# Patient Record
Sex: FEMALE | Race: WHITE | NOT HISPANIC OR LATINO | URBAN - NONMETROPOLITAN AREA
[De-identification: names, ages, dates, MRNs, and addresses within clinical notes are randomized per-mention and may not be internally consistent; named-entity substitution may affect disease eponyms.]

---

## 2021-04-02 ENCOUNTER — OFFICE VISIT (OUTPATIENT)
Dept: URBAN - NONMETROPOLITAN AREA CLINIC 13 | Facility: CLINIC | Age: 86
End: 2021-04-02

## 2021-04-16 ENCOUNTER — LAB OUTSIDE AN ENCOUNTER (OUTPATIENT)
Dept: URBAN - NONMETROPOLITAN AREA CLINIC 13 | Facility: CLINIC | Age: 86
End: 2021-04-16

## 2021-04-16 ENCOUNTER — OFFICE VISIT (OUTPATIENT)
Dept: URBAN - NONMETROPOLITAN AREA CLINIC 13 | Facility: CLINIC | Age: 86
End: 2021-04-16
Payer: MEDICARE

## 2021-04-16 DIAGNOSIS — R19.4 CHANGE IN BOWEL HABITS: ICD-10-CM

## 2021-04-16 DIAGNOSIS — K62.5 RECTAL BLEEDING: ICD-10-CM

## 2021-04-16 DIAGNOSIS — R19.5 MUCOUS IN STOOLS: ICD-10-CM

## 2021-04-16 DIAGNOSIS — D64.9 ANEMIA, UNSPECIFIED TYPE: ICD-10-CM

## 2021-04-16 PROCEDURE — 99204 OFFICE O/P NEW MOD 45 MIN: CPT | Performed by: NURSE PRACTITIONER

## 2021-04-16 RX ORDER — MULTIVIT-MIN/IRON/FOLIC ACID/K 18-600-40
AS DIRECTED CAPSULE ORAL
Status: ACTIVE | COMMUNITY

## 2021-04-16 RX ORDER — METOPROLOL TARTRATE 25 MG/1
1 TABLET WITH FOOD TABLET, FILM COATED ORAL TWICE A DAY
Status: ACTIVE | COMMUNITY

## 2021-04-16 RX ORDER — FAMOTIDINE 20 MG/1
1 TABLET AT BEDTIME AS NEEDED TABLET, FILM COATED ORAL ONCE A DAY
Status: ACTIVE | COMMUNITY

## 2021-04-16 RX ORDER — CLOTRIMAZOLE AND BETAMETHASONE DIPROPIONATE 10; .5 MG/ML; MG/ML
1 APPLICATION LOTION TOPICAL TWICE A DAY
Status: ACTIVE | COMMUNITY

## 2021-04-16 RX ORDER — SODIUM, POTASSIUM,MAG SULFATES 17.5-3.13G
354ML SOLUTION, RECONSTITUTED, ORAL ORAL
Qty: 354 MILLILITER | Refills: 0 | OUTPATIENT
Start: 2021-04-16 | End: 2021-04-17

## 2021-04-16 NOTE — HPI-TODAY'S VISIT:
4/16/2021 Ms. Liliya Veras is a 88-year-old female referred by Dr. Seth Bishop for rectal bleeding.  A copy of this document will be forwarded to the referring provider. Patient reports rectal bleeding off and on for "a long time".  She thinks this is related to hemorrhoids.  She will see bright red blood on the toilet tissue and occasionally in the toilet bowl.  She has not seen any blood in about 3 weeks.  Dr. Bishop did send over her recent labs and she has mild anemia with hemoglobin of 11.2 in June 2020 and 11.5 in November 2020.  She also relates change in bowel habits over the last couple of months.  She has 2 loose stools with urgency in the morning and occasionally another loose stool just before lunch.  This began after her second Covid shot.  Previously she had trouble with constipation and has been on stool softener for several years.  She stopped the stool softener yesterday.  She does report some mucus in her stool as well.  She denies abdominal pain.  No nausea vomiting.  No unexplained weight loss.  No heartburn.  No dysphagia.  TG

## 2021-06-15 ENCOUNTER — CLAIMS CREATED FROM THE CLAIM WINDOW (OUTPATIENT)
Dept: URBAN - NONMETROPOLITAN AREA SURGERY CENTER 1 | Facility: SURGERY CENTER | Age: 86
End: 2021-06-15
Payer: MEDICARE

## 2021-06-15 ENCOUNTER — CLAIMS CREATED FROM THE CLAIM WINDOW (OUTPATIENT)
Dept: URBAN - METROPOLITAN AREA CLINIC 4 | Facility: CLINIC | Age: 86
End: 2021-06-15
Payer: MEDICARE

## 2021-06-15 DIAGNOSIS — D50.9 ANEMIA, IRON DEFICIENCY: ICD-10-CM

## 2021-06-15 DIAGNOSIS — R19.4 ALTERED BOWEL HABITS: ICD-10-CM

## 2021-06-15 DIAGNOSIS — K90.0 ADULT CELIAC DISEASE: ICD-10-CM

## 2021-06-15 DIAGNOSIS — K31.89 MESENTEROAXIAL GASTRIC VOLVULUS: ICD-10-CM

## 2021-06-15 DIAGNOSIS — K31.89 ACQUIRED DEFORMITY OF DUODENUM: ICD-10-CM

## 2021-06-15 DIAGNOSIS — K90.0 CELIAC DISEASE: ICD-10-CM

## 2021-06-15 DIAGNOSIS — K63.89 HEPATIC FLEXURE MASS: ICD-10-CM

## 2021-06-15 PROCEDURE — 43239 EGD BIOPSY SINGLE/MULTIPLE: CPT | Performed by: INTERNAL MEDICINE

## 2021-06-15 PROCEDURE — 88305 TISSUE EXAM BY PATHOLOGIST: CPT | Performed by: PATHOLOGY

## 2021-06-15 PROCEDURE — 45380 COLONOSCOPY AND BIOPSY: CPT | Performed by: INTERNAL MEDICINE

## 2021-06-15 PROCEDURE — G8907 PT DOC NO EVENTS ON DISCHARG: HCPCS | Performed by: INTERNAL MEDICINE

## 2021-06-15 PROCEDURE — 88313 SPECIAL STAINS GROUP 2: CPT | Performed by: PATHOLOGY

## 2021-06-15 PROCEDURE — 88342 IMHCHEM/IMCYTCHM 1ST ANTB: CPT | Performed by: PATHOLOGY

## 2021-06-15 PROCEDURE — 88312 SPECIAL STAINS GROUP 1: CPT | Performed by: PATHOLOGY

## 2021-06-24 ENCOUNTER — TELEPHONE ENCOUNTER (OUTPATIENT)
Dept: URBAN - METROPOLITAN AREA CLINIC 92 | Facility: CLINIC | Age: 86
End: 2021-06-24

## 2021-06-24 ENCOUNTER — LAB OUTSIDE AN ENCOUNTER (OUTPATIENT)
Dept: URBAN - METROPOLITAN AREA CLINIC 92 | Facility: CLINIC | Age: 86
End: 2021-06-24

## 2021-07-02 LAB
ADDITIONAL INFORMATION:: (no result)
COMMENT:: (no result)
DEAMIDATED GLIADIN ABS, IGA: 4
DEAMIDATED GLIADIN ABS, IGG: 2
DQ2 (DQA1 0501/0505,DQB1 02XX): POSITIVE
DQ8 (DQA1 03XX, DQB1 0302): POSITIVE
ENDOMYSIAL ANTIBODY IGA: NEGATIVE
HEMATOCRIT: 32.4
HEMOGLOBIN: 10.8
IMMUNOGLOBULIN A, QN, SERUM: 199
MCH: 33
MCHC: 33.3
MCV: 99
NRBC: (no result)
PLATELETS: 212
RBC: 3.27
RDW: 12
T-TRANSGLUTAMINASE (TTG) IGA: <2
T-TRANSGLUTAMINASE (TTG) IGG: 8
WBC: 5.4

## 2021-07-08 ENCOUNTER — WEB ENCOUNTER (OUTPATIENT)
Dept: URBAN - NONMETROPOLITAN AREA CLINIC 13 | Facility: CLINIC | Age: 86
End: 2021-07-08

## 2021-07-08 ENCOUNTER — OFFICE VISIT (OUTPATIENT)
Dept: URBAN - NONMETROPOLITAN AREA CLINIC 13 | Facility: CLINIC | Age: 86
End: 2021-07-08
Payer: MEDICARE

## 2021-07-08 DIAGNOSIS — K90.0 CELIAC DISEASE: ICD-10-CM

## 2021-07-08 DIAGNOSIS — R19.4 CHANGE IN BOWEL HABITS: ICD-10-CM

## 2021-07-08 DIAGNOSIS — D64.9 ANEMIA, UNSPECIFIED TYPE: ICD-10-CM

## 2021-07-08 DIAGNOSIS — K62.5 RECTAL BLEEDING: ICD-10-CM

## 2021-07-08 PROCEDURE — 99214 OFFICE O/P EST MOD 30 MIN: CPT | Performed by: INTERNAL MEDICINE

## 2021-07-08 RX ORDER — CLOTRIMAZOLE AND BETAMETHASONE DIPROPIONATE 10; .5 MG/ML; MG/ML
1 APPLICATION LOTION TOPICAL TWICE A DAY
Status: ACTIVE | COMMUNITY

## 2021-07-08 RX ORDER — MULTIVIT-MIN/IRON/FOLIC ACID/K 18-600-40
AS DIRECTED CAPSULE ORAL
Status: ACTIVE | COMMUNITY

## 2021-07-08 RX ORDER — FAMOTIDINE 20 MG/1
1 TABLET AT BEDTIME AS NEEDED TABLET, FILM COATED ORAL ONCE A DAY
Status: ACTIVE | COMMUNITY

## 2021-07-08 RX ORDER — METOPROLOL TARTRATE 25 MG/1
1 TABLET WITH FOOD TABLET, FILM COATED ORAL TWICE A DAY
Status: ACTIVE | COMMUNITY

## 2021-07-08 NOTE — HPI-TODAY'S VISIT:
4/16/2021 Ms. Liliya Veras is a 88-year-old female referred by Dr. Seth Bishop for rectal bleeding.  A copy of this document will be forwarded to the referring provider. Patient reports rectal bleeding off and on for "a long time".  She thinks this is related to hemorrhoids.  She will see bright red blood on the toilet tissue and occasionally in the toilet bowl.  She has not seen any blood in about 3 weeks.  Dr. Bishop did send over her recent labs and she has mild anemia with hemoglobin of 11.2 in June 2020 and 11.5 in November 2020.  She also relates change in bowel habits over the last couple of months.  She has 2 loose stools with urgency in the morning and occasionally another loose stool just before lunch.  This began after her second Covid shot.  Previously she had trouble with constipation and has been on stool softener for several years.  She stopped the stool softener yesterday.  She does report some mucus in her stool as well.  She denies abdominal pain.  No nausea vomiting.  No unexplained weight loss.  No heartburn.  No dysphagia.  TG  7/8/21 Ms. Veras presents for follow up after EGD and colonoscopy for anemia and change in bowel habits.   EGD 6/15/21 with prepyloric gastritis. O/w normal exam. Gastric bx negative for H. pylori. 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 Colonoscopy 6/15/21 with sigmoid diverticulosis, nonbleeding internal hemorrhoids, o/w normal exam. Random colon bx with no significant abnormality.  Dr. Manuel did discuss path with patient and that this was suggestive of Celiac but not diagnostic. She completed celiac profile 6/24/2021 with elevated TTG IgG 8.  HLA typing for celiac shows positive DQ 2 and DQ 8.  Repeat CBC shows hemoglobin dropped from 11.2-10.8. Mrs. Veras reports that her bowel movements have normalized after colonoscopy. No rectal bleeding. TG

## 2021-09-03 ENCOUNTER — OFFICE VISIT (OUTPATIENT)
Dept: URBAN - NONMETROPOLITAN AREA CLINIC 2 | Facility: CLINIC | Age: 86
End: 2021-09-03
Payer: MEDICARE

## 2021-09-03 VITALS
HEART RATE: 55 BPM | BODY MASS INDEX: 22.02 KG/M2 | SYSTOLIC BLOOD PRESSURE: 150 MMHG | HEIGHT: 64 IN | WEIGHT: 129 LBS | DIASTOLIC BLOOD PRESSURE: 70 MMHG

## 2021-09-03 DIAGNOSIS — K90.0 CELIAC DISEASE: ICD-10-CM

## 2021-09-03 DIAGNOSIS — K64.8 INTERNAL HEMORRHOID: ICD-10-CM

## 2021-09-03 DIAGNOSIS — R10.9 ABDOMINAL PAIN: ICD-10-CM

## 2021-09-03 DIAGNOSIS — Z80.0 FAMILY HISTORY OF COLON CANCER: ICD-10-CM

## 2021-09-03 PROCEDURE — 99214 OFFICE O/P EST MOD 30 MIN: CPT | Performed by: INTERNAL MEDICINE

## 2021-09-03 RX ORDER — CLOTRIMAZOLE AND BETAMETHASONE DIPROPIONATE 10; .5 MG/ML; MG/ML
1 APPLICATION LOTION TOPICAL TWICE A DAY
Status: ACTIVE | COMMUNITY

## 2021-09-03 RX ORDER — HYDROCORTISONE 25 MG/G
1 APPLICATION CREAM TOPICAL TWICE A DAY
Qty: 30 GRAM | Refills: 3 | OUTPATIENT
Start: 2021-09-03 | End: 2021-10-29

## 2021-09-03 RX ORDER — METOPROLOL TARTRATE 25 MG/1
1 TABLET WITH FOOD TABLET, FILM COATED ORAL TWICE A DAY
Status: ACTIVE | COMMUNITY

## 2021-09-03 RX ORDER — MULTIVIT-MIN/IRON/FOLIC ACID/K 18-600-40
AS DIRECTED CAPSULE ORAL
Status: ACTIVE | COMMUNITY

## 2021-09-03 RX ORDER — FAMOTIDINE 20 MG/1
1 TABLET AT BEDTIME AS NEEDED TABLET, FILM COATED ORAL ONCE A DAY
Status: ACTIVE | COMMUNITY

## 2021-09-03 NOTE — PHYSICAL EXAM GASTROINTESTINAL
Abdomen , soft, nontender, nondistended , no guarding or rigidity , no masses palpable , normal bowel sounds , Liver and Spleen , no hepatomegaly present , no hepatosplenomegaly , liver nontender , spleen not palpable , Rectal , normal sphincter tone , There is an internal hemorrhoid at 3 o'clock. There is mild tenderness. No rectal mass or bleeding.

## 2021-09-03 NOTE — HPI-TODAY'S VISIT:
Patient returns for follow-up of celiac disease.  She has been on a gluten-free diet for the last 3 months.  She states that she is doing well.  Her bowel movements are normal.  She denies abdominal pain, gas or bloating.  There is no distention.  She denies nausea or vomiting.  Her appetite is okay.  Her weight is up.  She is being very [employer] about the gluten-free diet. She is complaining today of a "raw" area in her rectum.  This is noticeable primarily when she cleans herself.  She has little rectal discomfort with bowel movements.  She denies itching.  She thinks that this may be a hemorrhoid.  There is little, if any bleeding.

## 2021-09-05 LAB
T-TRANSGLUTAMINASE (TTG) IGA: <2
T-TRANSGLUTAMINASE (TTG) IGG: 8

## 2021-10-08 ENCOUNTER — LAB OUTSIDE AN ENCOUNTER (OUTPATIENT)
Dept: URBAN - NONMETROPOLITAN AREA CLINIC 13 | Facility: CLINIC | Age: 86
End: 2021-10-08

## 2021-10-29 ENCOUNTER — OFFICE VISIT (OUTPATIENT)
Dept: URBAN - NONMETROPOLITAN AREA CLINIC 2 | Facility: CLINIC | Age: 86
End: 2021-10-29

## 2021-12-02 ENCOUNTER — OFFICE VISIT (OUTPATIENT)
Dept: URBAN - NONMETROPOLITAN AREA CLINIC 13 | Facility: CLINIC | Age: 86
End: 2021-12-02

## 2022-03-03 ENCOUNTER — DASHBOARD ENCOUNTERS (OUTPATIENT)
Age: 87
End: 2022-03-03

## 2022-03-03 ENCOUNTER — OFFICE VISIT (OUTPATIENT)
Dept: URBAN - NONMETROPOLITAN AREA CLINIC 13 | Facility: CLINIC | Age: 87
End: 2022-03-03
Payer: MEDICARE

## 2022-03-03 DIAGNOSIS — R10.9 ABDOMINAL PAIN: ICD-10-CM

## 2022-03-03 DIAGNOSIS — Z80.0 FAMILY HISTORY OF COLON CANCER: ICD-10-CM

## 2022-03-03 DIAGNOSIS — K90.0 CELIAC DISEASE: ICD-10-CM

## 2022-03-03 DIAGNOSIS — K64.8 INTERNAL HEMORRHOID: ICD-10-CM

## 2022-03-03 PROCEDURE — 99214 OFFICE O/P EST MOD 30 MIN: CPT | Performed by: INTERNAL MEDICINE

## 2022-03-03 RX ORDER — FAMOTIDINE 20 MG/1
1 TABLET AT BEDTIME AS NEEDED TABLET, FILM COATED ORAL ONCE A DAY
Status: ACTIVE | COMMUNITY

## 2022-03-03 RX ORDER — MULTIVIT-MIN/IRON/FOLIC ACID/K 18-600-40
AS DIRECTED CAPSULE ORAL
Status: ACTIVE | COMMUNITY

## 2022-03-03 RX ORDER — METOPROLOL TARTRATE 25 MG/1
1 TABLET WITH FOOD TABLET, FILM COATED ORAL TWICE A DAY
Status: ACTIVE | COMMUNITY

## 2022-03-03 RX ORDER — CLOTRIMAZOLE AND BETAMETHASONE DIPROPIONATE 10; .5 MG/ML; MG/ML
1 APPLICATION LOTION TOPICAL TWICE A DAY
Status: ACTIVE | COMMUNITY

## 2022-03-03 NOTE — HPI-TODAY'S VISIT:
Patient returns for follow-up of celiac disease.  She is on a gluten-free diet.  She denies abdominal pain or bloating.  Her bowel movements are normal.  She occasionally will see streaks of blood on the tissue.  She does have hemorrhoids that do not bother her.  Her appetite is good and her weight is stable.  She states that her PCP did a blood test for celiac and that it was "okay".

## 2022-03-09 PROBLEM — 90458007: Status: ACTIVE | Noted: 2021-09-03

## 2022-03-09 PROBLEM — 396331005: Status: ACTIVE | Noted: 2021-06-24

## 2024-05-27 NOTE — PHYSICAL EXAM NECK/THYROID:
normal appearance , without tenderness upon palpation , no deformities , trachea midline , Thyroid normal size , no thyroid nodules , no masses , no JVD , thyroid nontender
Name band;

## 2025-06-13 ENCOUNTER — OFFICE VISIT (OUTPATIENT)
Dept: URBAN - NONMETROPOLITAN AREA CLINIC 2 | Facility: CLINIC | Age: OVER 89
End: 2025-06-13
Payer: MEDICARE

## 2025-06-13 DIAGNOSIS — K62.3 RECTAL PROLAPSE: ICD-10-CM

## 2025-06-13 DIAGNOSIS — K90.0 CELIAC DISEASE: ICD-10-CM

## 2025-06-13 DIAGNOSIS — Z80.0 FAMILY HISTORY OF COLON CANCER: ICD-10-CM

## 2025-06-13 DIAGNOSIS — R10.9 ABDOMINAL PAIN: ICD-10-CM

## 2025-06-13 PROCEDURE — 99214 OFFICE O/P EST MOD 30 MIN: CPT | Performed by: INTERNAL MEDICINE

## 2025-06-13 RX ORDER — MULTIVIT-MIN/IRON/FOLIC ACID/K 18-600-40
AS DIRECTED CAPSULE ORAL
Status: ACTIVE | COMMUNITY

## 2025-06-13 RX ORDER — METOPROLOL TARTRATE 25 MG/1
1 TABLET WITH FOOD TABLET, FILM COATED ORAL TWICE A DAY
Status: ACTIVE | COMMUNITY

## 2025-06-13 RX ORDER — FAMOTIDINE 20 MG/1
1 TABLET AT BEDTIME AS NEEDED TABLET, FILM COATED ORAL ONCE A DAY
Status: ACTIVE | COMMUNITY

## 2025-06-13 RX ORDER — CLOTRIMAZOLE AND BETAMETHASONE DIPROPIONATE 10; .5 MG/ML; MG/ML
1 APPLICATION LOTION TOPICAL TWICE A DAY
Status: ACTIVE | COMMUNITY

## 2025-06-13 NOTE — HPI-TODAY'S VISIT:
3/3/22 (Dr. Manuel): Patient returns for follow-up of celiac disease.  She is on a gluten-free diet.  She denies abdominal pain or bloating.  Her bowel movements are normal.  She occasionally will see streaks of blood on the tissue.  She does have hemorrhoids that do not bother her.  Her appetite is good and her weight is stable.  She states that her PCP did a blood test for celiac and that it was "okay".  6/13/25: Ms. Liliya Veras is a 89 y/o F with a history of Celiac disease discovered late in life by Dr. Manuel  She presents for hemorrhoid evaluation.  She has been losing stool in her underwear and bleeding a lot.  On exam she has rectal prolapse.

## 2025-08-05 ENCOUNTER — OFFICE VISIT (OUTPATIENT)
Age: OVER 89
End: 2025-08-05
Payer: MEDICARE

## 2025-08-05 DIAGNOSIS — Z80.0 FAMILY HISTORY OF COLON CANCER: ICD-10-CM

## 2025-08-05 DIAGNOSIS — K62.5 RECTAL BLEEDING: ICD-10-CM

## 2025-08-05 DIAGNOSIS — K90.0 CELIAC DISEASE: ICD-10-CM

## 2025-08-05 PROCEDURE — 99214 OFFICE O/P EST MOD 30 MIN: CPT | Performed by: NURSE PRACTITIONER

## 2025-08-05 RX ORDER — METOPROLOL TARTRATE 25 MG/1
1 TABLET WITH FOOD TABLET, FILM COATED ORAL TWICE A DAY
Status: ACTIVE | COMMUNITY

## 2025-08-05 RX ORDER — FAMOTIDINE 20 MG/1
1 TABLET AT BEDTIME AS NEEDED TABLET, FILM COATED ORAL ONCE A DAY
Status: ACTIVE | COMMUNITY

## 2025-08-05 RX ORDER — MULTIVIT-MIN/IRON/FOLIC ACID/K 18-600-40
AS DIRECTED CAPSULE ORAL
Status: ACTIVE | COMMUNITY

## 2025-08-05 RX ORDER — CLOTRIMAZOLE AND BETAMETHASONE DIPROPIONATE 10; .5 MG/ML; MG/ML
1 APPLICATION LOTION TOPICAL TWICE A DAY
Status: ACTIVE | COMMUNITY